# Patient Record
(demographics unavailable — no encounter records)

---

## 2024-12-26 NOTE — ASSESSMENT
[FreeTextEntry1] : Left nipple pain without suspicious findings clinically or on MRI  There is no history of discharge  Patient was reassured  She was asked to return to normal imaging  She was asked to follow-up with me as needed  A total of 20 minutes was spent in consultation

## 2024-12-26 NOTE — HISTORY OF PRESENT ILLNESS
[FreeTextEntry1] : Patient states she feels a sensation and discomfort to the left nipple.  Denies nipple discharge, redness on the skin, palpable mass.  Sister at age 55 had breast cancer.  MRI of the breast was performed at [facility] and reported as normal.